# Patient Record
Sex: MALE | Race: WHITE | NOT HISPANIC OR LATINO | URBAN - NONMETROPOLITAN AREA
[De-identification: names, ages, dates, MRNs, and addresses within clinical notes are randomized per-mention and may not be internally consistent; named-entity substitution may affect disease eponyms.]

---

## 2021-09-07 ENCOUNTER — IMPORTED ENCOUNTER (OUTPATIENT)
Dept: URBAN - NONMETROPOLITAN AREA CLINIC 1 | Facility: CLINIC | Age: 51
End: 2021-09-07

## 2021-09-07 PROBLEM — H52.223: Noted: 2017-10-10

## 2021-09-07 PROBLEM — H52.11: Noted: 2021-09-07

## 2021-09-07 PROBLEM — H52.4: Noted: 2017-10-10

## 2021-09-07 PROCEDURE — 92015 DETERMINE REFRACTIVE STATE: CPT

## 2021-09-07 PROCEDURE — 92004 COMPRE OPH EXAM NEW PT 1/>: CPT

## 2021-09-07 NOTE — PATIENT DISCUSSION
Myopia OD/Astigmatism OU w/Presbyopia -  discussed findings w/ patient-  new spectacle Rx issued -  continue to monitor; 's Notes: MR 9/7/2021DFE 9/7/2021

## 2022-04-09 ASSESSMENT — TONOMETRY
OD_IOP_MMHG: 12
OS_IOP_MMHG: 13

## 2022-04-09 ASSESSMENT — VISUAL ACUITY
OS_CC: 20/25-
OD_CC: 20/30-